# Patient Record
Sex: FEMALE | Race: BLACK OR AFRICAN AMERICAN | Employment: FULL TIME | URBAN - METROPOLITAN AREA
[De-identification: names, ages, dates, MRNs, and addresses within clinical notes are randomized per-mention and may not be internally consistent; named-entity substitution may affect disease eponyms.]

---

## 2019-02-27 ENCOUNTER — OP HISTORICAL/CONVERTED ENCOUNTER (OUTPATIENT)
Dept: OTHER | Age: 24
End: 2019-02-27

## 2019-03-07 ENCOUNTER — OP HISTORICAL/CONVERTED ENCOUNTER (OUTPATIENT)
Dept: OTHER | Age: 24
End: 2019-03-07

## 2019-04-03 ENCOUNTER — OP HISTORICAL/CONVERTED ENCOUNTER (OUTPATIENT)
Dept: OTHER | Age: 24
End: 2019-04-03

## 2019-04-07 ENCOUNTER — IP HISTORICAL/CONVERTED ENCOUNTER (OUTPATIENT)
Dept: OTHER | Age: 24
End: 2019-04-07

## 2022-10-02 ENCOUNTER — HOSPITAL ENCOUNTER (EMERGENCY)
Age: 27
Discharge: HOME | End: 2022-10-02
Attending: EMERGENCY MEDICINE
Payer: COMMERCIAL

## 2022-10-02 VITALS
DIASTOLIC BLOOD PRESSURE: 63 MMHG | WEIGHT: 156 LBS | BODY MASS INDEX: 25.99 KG/M2 | HEART RATE: 80 BPM | SYSTOLIC BLOOD PRESSURE: 105 MMHG | TEMPERATURE: 98.3 F | OXYGEN SATURATION: 100 % | HEIGHT: 65 IN | RESPIRATION RATE: 18 BRPM

## 2022-10-02 DIAGNOSIS — J11.1 FLU: ICD-10-CM

## 2022-10-02 DIAGNOSIS — B34.9 VIRAL ILLNESS: Primary | ICD-10-CM

## 2022-10-02 LAB
DEPRECATED S PYO AG THROAT QL EIA: NEGATIVE
FLUAV AG NPH QL IA: NEGATIVE
FLUBV AG NOSE QL IA: NEGATIVE

## 2022-10-02 PROCEDURE — 87804 INFLUENZA ASSAY W/OPTIC: CPT

## 2022-10-02 PROCEDURE — 87070 CULTURE OTHR SPECIMN AEROBIC: CPT

## 2022-10-02 PROCEDURE — 87880 STREP A ASSAY W/OPTIC: CPT

## 2022-10-02 PROCEDURE — 99283 EMERGENCY DEPT VISIT LOW MDM: CPT

## 2022-10-02 RX ORDER — IBUPROFEN 400 MG/1
400 TABLET ORAL
Qty: 20 TABLET | Refills: 0 | Status: SHIPPED | OUTPATIENT
Start: 2022-10-02

## 2022-10-02 NOTE — ED TRIAGE NOTES
Pt c/o flu-like symptoms since Tuesday; pt's  was Flu+ on 9/30    Pt would like to be tested for Flu

## 2022-10-02 NOTE — ED PROVIDER NOTES
EMERGENCY DEPARTMENT HISTORY AND PHYSICAL EXAM      Date: 10/2/2022  Patient Name: Ashley Murrell    History of Presenting Illness     Chief Complaint   Patient presents with    Flu Like Symptoms       History Provided By: Patient    HPI: Ashley Murrell, 33 y.o. female NO PMHx presents with cough, nasal congestion, sore throat for 5 days. States she has 'flu like symptoms 'and was exposed to her  who tested positive for flu. Denies NVD, feels better. No SOB ageusia or anosmia. There are no other complaints, changes, or physical findings at this time. PCP: None    No current facility-administered medications on file prior to encounter. No current outpatient medications on file prior to encounter. Past History     Past Medical History:  History reviewed. No pertinent past medical history. Past Surgical History:  History reviewed. No pertinent surgical history. Family History:  History reviewed. No pertinent family history. Social History:  Social History     Tobacco Use    Smoking status: Never    Smokeless tobacco: Never   Substance Use Topics    Alcohol use: Not Currently    Drug use: Never       Allergies:  No Known Allergies    Review of Systems   Review of Systems   Constitutional: Negative. HENT:  Positive for congestion and sore throat. Respiratory:  Positive for cough. Cardiovascular: Negative. Gastrointestinal: Negative. Genitourinary: Negative. Neurological: Negative. All other systems reviewed and are negative. Physical Exam   Physical Exam  Vitals and nursing note reviewed. Constitutional:       Appearance: Normal appearance. HENT:      Head: Normocephalic. Eyes:      Extraocular Movements: Extraocular movements intact. Pupils: Pupils are equal, round, and reactive to light. Cardiovascular:      Rate and Rhythm: Normal rate and regular rhythm. Pulses: Normal pulses. Heart sounds: Normal heart sounds.    Pulmonary:      Effort: Pulmonary effort is normal.      Breath sounds: Normal breath sounds. Abdominal:      Palpations: Abdomen is soft. Tenderness: There is no abdominal tenderness. Musculoskeletal:      Cervical back: Normal range of motion and neck supple. Neurological:      General: No focal deficit present. Mental Status: She is alert and oriented to person, place, and time. Lab and Diagnostic Study Results   Labs -     Recent Results (from the past 12 hour(s))   STREP AG SCREEN, GROUP A    Collection Time: 10/02/22  6:03 PM    Specimen: Serum; Throat   Result Value Ref Range    Group A Strep Ag ID Negative     INFLUENZA A & B AG (RAPID TEST)    Collection Time: 10/02/22  6:06 PM   Result Value Ref Range    Influenza A Antigen Negative Negative      Influenza B Antigen Negative Negative         Radiologic Studies -   @lastxrresult@  CT Results  (Last 48 hours)      None          CXR Results  (Last 48 hours)      None            Medical Decision Making and ED Course   Differential Diagnosis & Medical Decision Making Provider Note:       - I am the first provider for this patient. I reviewed the vital signs, available nursing notes, past medical history, past surgical history, family history and social history. The patients presenting problems have been discussed, and they are in agreement with the care plan formulated and outlined with them. I have encouraged them to ask questions as they arise throughout their visit. Vital Signs-Reviewed the patient's vital signs. Patient Vitals for the past 12 hrs:   Temp Pulse Resp BP SpO2   10/02/22 1759 98.3 °F (36.8 °C) 80 18 105/63 100 %       ED Course:          Procedures   Performed by: Angela Ewing MD  Procedures      Disposition   Disposition: Condition stable and improved  DC- Adult Discharges: All of the diagnostic tests were reviewed and questions answered. Diagnosis, care plan and treatment options were discussed.   The patient understands the instructions and will follow up as directed. The patients results have been reviewed with them. They have been counseled regarding their diagnosis. The patient verbally convey understanding and agreement of the signs, symptoms, diagnosis, treatment and prognosis and additionally agrees to follow up as recommended with their PCP in 24 - 48 hours. They also agree with the care-plan and convey that all of their questions have been answered. I have also put together some discharge instructions for them that include: 1) educational information regarding their diagnosis, 2) how to care for their diagnosis at home, as well a 3) list of reasons why they would want to return to the ED prior to their follow-up appointment, should their condition change. DISCHARGE PLAN:  1. There are no discharge medications for this patient. 2.   Follow-up Information       Follow up With Specialties Details Why Contact Info    Laury Redd MD Internal Medicine Physician In 1 week  [address] [facility]  [phone]            3. Return to ED if worse   4. Current Discharge Medication List        START taking these medications    Details   ibuprofen (MOTRIN) 400 mg tablet Take 1 Tablet by mouth every six (6) hours as needed for Pain. Qty: 20 Tablet, Refills: 0  Start date: 10/2/2022            Remove if admitted/transferred    Diagnosis/Clinical Impression     Clinical Impression:   1. Viral illness    2. Flu        Attestations: Piedad Dean MD, am the primary clinician of record. Please note that this dictation was completed with [employer], the computer voice recognition software. Quite often unanticipated grammatical, syntax, homophones, and other interpretive errors are inadvertently transcribed by the computer software. Please disregard these errors. Please excuse any errors that have escaped final proofreading. Thank you.

## 2022-10-03 LAB
BACTERIA SPEC CULT: NORMAL
SPECIAL REQUESTS,SREQ: NORMAL

## 2023-05-18 RX ORDER — IBUPROFEN 400 MG/1
400 TABLET ORAL EVERY 6 HOURS PRN
COMMUNITY
Start: 2022-10-02 | End: 2023-07-20

## 2023-06-08 ENCOUNTER — TELEPHONE (OUTPATIENT)
Age: 28
End: 2023-06-08

## 2023-06-08 NOTE — TELEPHONE ENCOUNTER
06/08/23 2:23PM R/T call to the patient as she left a vm @ 1:51PM 06/08/23 requesting to be scheduled for her annual appt----spw pt she is now scheduled to see Dr. Eva Chandra as a new patient since her last visit was over 3 years ago----patient is scheduled on 07/20/23 at 2:00PM here in [facility] to see Dr. Eva Chandra.

## 2023-07-20 ENCOUNTER — OFFICE VISIT (OUTPATIENT)
Age: 28
End: 2023-07-20
Payer: COMMERCIAL

## 2023-07-20 VITALS
DIASTOLIC BLOOD PRESSURE: 70 MMHG | BODY MASS INDEX: 25.08 KG/M2 | HEIGHT: 65 IN | SYSTOLIC BLOOD PRESSURE: 112 MMHG | WEIGHT: 150.5 LBS

## 2023-07-20 DIAGNOSIS — Z01.419 GYNECOLOGIC EXAM NORMAL: ICD-10-CM

## 2023-07-20 DIAGNOSIS — Z12.4 PAP SMEAR FOR CERVICAL CANCER SCREENING: Primary | ICD-10-CM

## 2023-07-20 DIAGNOSIS — K64.4 EXTERNAL HEMORRHOIDS: ICD-10-CM

## 2023-07-20 PROCEDURE — 99385 PREV VISIT NEW AGE 18-39: CPT | Performed by: OBSTETRICS & GYNECOLOGY

## 2023-07-20 ASSESSMENT — ENCOUNTER SYMPTOMS
RESPIRATORY NEGATIVE: 1
GASTROINTESTINAL NEGATIVE: 1

## 2023-07-24 LAB
., LABCORP: NORMAL
CYTOLOGIST CVX/VAG CYTO: NORMAL
CYTOLOGY CVX/VAG DOC CYTO: NORMAL
CYTOLOGY CVX/VAG DOC THIN PREP: NORMAL
DX ICD CODE: NORMAL
Lab: NORMAL
Lab: NORMAL
OTHER STN SPEC: NORMAL
STAT OF ADQ CVX/VAG CYTO-IMP: NORMAL

## 2024-08-07 ENCOUNTER — OFFICE VISIT (OUTPATIENT)
Age: 29
End: 2024-08-07
Payer: COMMERCIAL

## 2024-08-07 VITALS
BODY MASS INDEX: 24.68 KG/M2 | HEIGHT: 65 IN | SYSTOLIC BLOOD PRESSURE: 110 MMHG | WEIGHT: 148.13 LBS | DIASTOLIC BLOOD PRESSURE: 64 MMHG

## 2024-08-07 DIAGNOSIS — Z12.4 PAP SMEAR FOR CERVICAL CANCER SCREENING: Primary | ICD-10-CM

## 2024-08-07 DIAGNOSIS — Z80.3 FAMILY HISTORY OF BREAST CANCER IN MOTHER: ICD-10-CM

## 2024-08-07 DIAGNOSIS — Z01.419 GYNECOLOGIC EXAM NORMAL: ICD-10-CM

## 2024-08-07 PROCEDURE — 99395 PREV VISIT EST AGE 18-39: CPT | Performed by: OBSTETRICS & GYNECOLOGY

## 2024-08-07 ASSESSMENT — ENCOUNTER SYMPTOMS
GASTROINTESTINAL NEGATIVE: 1
RESPIRATORY NEGATIVE: 1

## 2024-08-07 NOTE — PROGRESS NOTES
Chief Complaint   Patient presents with    Annual Exam     /64 (Site: Left Upper Arm, Position: Sitting, Cuff Size: Small Adult)   Ht 1.651 m (5' 5\")   Wt 67.2 kg (148 lb 2 oz)   LMP 07/15/2024 (Exact Date)   BMI 24.65 kg/m²

## 2024-08-07 NOTE — PROGRESS NOTES
Karely Tinajero is a , 29 y.o. female   Patient's last menstrual period was 07/15/2024 (exact date).    She presents for her annual    She is having no significant problems.      Menstrual status:  Cycles are usually regular with a 26-32 day interval with 3-7 day duration.    Flow: moderate.      She does not have dysmenorrhea.      Medical conditions:  Since her last annual GYN exam about one year ago, she has not the following changes in her health history: none.     Mammogram History:    EBONY Results (most recent):  @[facility](FOY0832:1)@     DEXA Results (most recent):  @[facility](UAT2525:1)@       History reviewed. No pertinent past medical history.  History reviewed. No pertinent surgical history.    Prior to Admission medications    Not on File       No Known Allergies       Tobacco History:  reports that she has never smoked. She has never used smokeless tobacco.  Alcohol use:  reports that she does not currently use alcohol.  Drug use:  reports no history of drug use.    Family Medical/Cancer History:   Family History   Problem Relation Age of Onset    No Known Problems Mother     No Known Problems Father         Review of Systems   Constitutional: Negative.    Respiratory: Negative.     Cardiovascular: Negative.    Gastrointestinal: Negative.    Genitourinary: Negative.    Musculoskeletal: Negative.    Neurological: Negative.    Psychiatric/Behavioral: Negative.           /64 (Site: Left Upper Arm, Position: Sitting, Cuff Size: Small Adult)   Ht 1.651 m (5' 5\")   Wt 67.2 kg (148 lb 2 oz)   LMP 07/15/2024 (Exact Date)   BMI 24.65 kg/m²     Physical Exam  Constitutional:       Appearance: Normal appearance.   Cardiovascular:      Rate and Rhythm: Normal rate and regular rhythm.   Pulmonary:      Effort: Pulmonary effort is normal.      Breath sounds: Normal breath sounds.   Chest:   Breasts:     Right: Normal.      Left: Normal.   Abdominal:      General: Abdomen is flat.

## 2024-08-10 LAB
., LABCORP: NORMAL
CYTOLOGIST CVX/VAG CYTO: NORMAL
CYTOLOGY CVX/VAG DOC CYTO: NORMAL
CYTOLOGY CVX/VAG DOC THIN PREP: NORMAL
DX ICD CODE: NORMAL
Lab: NORMAL
OTHER STN SPEC: NORMAL
STAT OF ADQ CVX/VAG CYTO-IMP: NORMAL